# Patient Record
Sex: MALE
[De-identification: names, ages, dates, MRNs, and addresses within clinical notes are randomized per-mention and may not be internally consistent; named-entity substitution may affect disease eponyms.]

---

## 2024-03-27 ENCOUNTER — APPOINTMENT (OUTPATIENT)
Dept: ORTHOPEDIC SURGERY | Facility: CLINIC | Age: 69
End: 2024-03-27
Payer: MEDICARE

## 2024-03-27 VITALS — WEIGHT: 220 LBS | HEIGHT: 66 IN | BODY MASS INDEX: 35.36 KG/M2

## 2024-03-27 PROBLEM — Z00.00 ENCOUNTER FOR PREVENTIVE HEALTH EXAMINATION: Status: ACTIVE | Noted: 2024-03-27

## 2024-03-27 PROCEDURE — 73560 X-RAY EXAM OF KNEE 1 OR 2: CPT | Mod: LT,RT

## 2024-03-27 PROCEDURE — 99203 OFFICE O/P NEW LOW 30 MIN: CPT | Mod: 25

## 2024-03-27 RX ORDER — LIDOCAINE 5% 700 MG/1
5 PATCH TOPICAL
Qty: 30 | Refills: 1 | Status: ACTIVE | COMMUNITY
Start: 2024-03-27 | End: 1900-01-01

## 2024-03-27 RX ORDER — CHOLECALCIFEROL (VITAMIN D3) 50 MCG
50 MCG TABLET ORAL
Qty: 60 | Refills: 0 | Status: ACTIVE | COMMUNITY
Start: 2024-03-27 | End: 1900-01-01

## 2024-03-27 RX ORDER — ACETAMINOPHEN 500 MG/1
500 TABLET ORAL 3 TIMES DAILY
Qty: 120 | Refills: 0 | Status: ACTIVE | COMMUNITY
Start: 2024-03-27 | End: 1900-01-01

## 2024-03-27 RX ORDER — MELOXICAM 7.5 MG/1
7.5 TABLET ORAL
Qty: 30 | Refills: 0 | Status: ACTIVE | COMMUNITY
Start: 2024-03-27 | End: 1900-01-01

## 2024-03-27 RX ORDER — DICLOFENAC SODIUM 1% 10 MG/G
1 GEL TOPICAL DAILY
Qty: 1 | Refills: 4 | Status: ACTIVE | COMMUNITY
Start: 2024-03-27 | End: 1900-01-01

## 2024-03-27 NOTE — DISCUSSION/SUMMARY
[de-identified] : HE HAS PAIN IN BOTH KNEES.  X-RAYS TAKEN TODAY IN OUR OFFICE OF BOTH KNEES STANDING 2 VIEWS DO NOT SHOW SEVERE DJD EVEN THOUGH CLINICALLY HE DOES HAVE SOME GENU VARUM  WE ARE TO SEND HIM FOR MRIS OF BOTH KNEES AS WELL AS PHYSICAL THERAPY FOR BOTH KNEES AND GENTLE AEROBIC CONDITIONING.  We are prescribing meloxicam, Tylenol, calcium, vitamin D, Lidoderm patches and Voltaren gel  OVERALL PHYSICAL EXAM GENERAL: Well developed well nourished in no acute distress   MENTAL:Alert and oriented x3, normal affect, Pleasant and accompanied by family   MUSCULOSKELETAL: Ambulating independently   HEENT: NCAT, EOM intact, mucosa moist, neck supple with adequate free rom   CARDIOVASCULAR/HEMATOLOGICAL: no JVD, no peripheral edema, good capillary refill,  skin warm and well perfused, no venous stasis ulcers, pulses intact, no pallor or superficial non-traumatic bruising   NEUROLOGICAL: free passive rom without rigidity or cog wheel motion   RESPIRATORY: no gross stridor or wheezing or increased respiratory effort or use of O2, good capil refill and color   INTEGUMENTARY: skin intact without obvious suspicious lesions where examined Bilateral KNEE EXAM There range of motion is preserved.  They are slightly uncomfortable at the patellofemoral joint with compression and with resisted straight leg raising.  There is a very mild effusion and they do have joint line tenderness medial greater than lateral.  Anterior and posterior drawer are negative.  The knee is stable to varus valgus stress there neurovascular intact without signs or symptoms of DVT

## 2024-04-09 ENCOUNTER — APPOINTMENT (OUTPATIENT)
Dept: MRI IMAGING | Facility: CLINIC | Age: 69
End: 2024-04-09

## 2024-05-31 ENCOUNTER — APPOINTMENT (OUTPATIENT)
Dept: MRI IMAGING | Facility: CLINIC | Age: 69
End: 2024-05-31
Payer: MEDICARE

## 2024-05-31 PROCEDURE — 73721 MRI JNT OF LWR EXTRE W/O DYE: CPT | Mod: RT

## 2024-05-31 PROCEDURE — 73721 MRI JNT OF LWR EXTRE W/O DYE: CPT | Mod: LT

## 2024-06-24 ENCOUNTER — APPOINTMENT (OUTPATIENT)
Dept: ORTHOPEDIC SURGERY | Facility: CLINIC | Age: 69
End: 2024-06-24
Payer: MEDICARE

## 2024-06-24 DIAGNOSIS — M85.89 OTHER SPECIFIED DISORDERS OF BONE DENSITY AND STRUCTURE, MULTIPLE SITES: ICD-10-CM

## 2024-06-24 DIAGNOSIS — M23.92 UNSPECIFIED INTERNAL DERANGEMENT OF LEFT KNEE: ICD-10-CM

## 2024-06-24 DIAGNOSIS — M23.91 UNSPECIFIED INTERNAL DERANGEMENT OF RIGHT KNEE: ICD-10-CM

## 2024-06-24 PROCEDURE — 99213 OFFICE O/P EST LOW 20 MIN: CPT

## 2024-07-10 ENCOUNTER — APPOINTMENT (OUTPATIENT)
Dept: ORTHOPEDIC SURGERY | Facility: CLINIC | Age: 69
End: 2024-07-10
Payer: MEDICARE

## 2024-07-10 DIAGNOSIS — S83.232D COMPLEX TEAR OF MEDIAL MENISCUS, CURRENT INJURY, LEFT KNEE, SUBSEQUENT ENCOUNTER: ICD-10-CM

## 2024-07-10 DIAGNOSIS — S83.231D COMPLEX TEAR OF MEDIAL MENISCUS, CURRENT INJURY, RIGHT KNEE, SUBSEQUENT ENCOUNTER: ICD-10-CM

## 2024-07-10 PROCEDURE — 99214 OFFICE O/P EST MOD 30 MIN: CPT

## 2024-08-06 ENCOUNTER — TRANSCRIPTION ENCOUNTER (OUTPATIENT)
Age: 69
End: 2024-08-06

## 2024-08-06 ENCOUNTER — OUTPATIENT (OUTPATIENT)
Dept: OUTPATIENT SERVICES | Facility: HOSPITAL | Age: 69
LOS: 1 days | Discharge: HOME | End: 2024-08-06
Payer: MEDICARE

## 2024-08-06 ENCOUNTER — APPOINTMENT (OUTPATIENT)
Dept: ORTHOPEDIC SURGERY | Facility: AMBULATORY SURGERY CENTER | Age: 69
End: 2024-08-06

## 2024-08-06 VITALS
HEART RATE: 73 BPM | SYSTOLIC BLOOD PRESSURE: 109 MMHG | OXYGEN SATURATION: 99 % | RESPIRATION RATE: 12 BRPM | DIASTOLIC BLOOD PRESSURE: 66 MMHG

## 2024-08-06 VITALS
WEIGHT: 199.96 LBS | TEMPERATURE: 98 F | RESPIRATION RATE: 18 BRPM | HEART RATE: 64 BPM | HEIGHT: 65 IN | DIASTOLIC BLOOD PRESSURE: 61 MMHG | OXYGEN SATURATION: 97 % | SYSTOLIC BLOOD PRESSURE: 105 MMHG

## 2024-08-06 DIAGNOSIS — S83.231D COMPLEX TEAR OF MEDIAL MENISCUS, CURRENT INJURY, RIGHT KNEE, SUBSEQUENT ENCOUNTER: ICD-10-CM

## 2024-08-06 DIAGNOSIS — M23.221 DERANGEMENT OF POSTERIOR HORN OF MEDIAL MENISCUS DUE TO OLD TEAR OR INJURY, RIGHT KNEE: ICD-10-CM

## 2024-08-06 DIAGNOSIS — E78.00 PURE HYPERCHOLESTEROLEMIA, UNSPECIFIED: ICD-10-CM

## 2024-08-06 DIAGNOSIS — M94.261 CHONDROMALACIA, RIGHT KNEE: ICD-10-CM

## 2024-08-06 DIAGNOSIS — I10 ESSENTIAL (PRIMARY) HYPERTENSION: ICD-10-CM

## 2024-08-06 DIAGNOSIS — Z79.82 LONG TERM (CURRENT) USE OF ASPIRIN: ICD-10-CM

## 2024-08-06 PROCEDURE — 29881 ARTHRS KNE SRG MNISECTMY M/L: CPT | Mod: RT

## 2024-08-06 RX ORDER — HYDROMORPHONE HCL IN 0.9% NACL 0.2 MG/ML
0.5 PLASTIC BAG, INJECTION (ML) INTRAVENOUS
Refills: 0 | Status: DISCONTINUED | OUTPATIENT
Start: 2024-08-06 | End: 2024-08-06

## 2024-08-06 RX ORDER — ACETAMINOPHEN 500 MG
1000 TABLET ORAL ONCE
Refills: 0 | Status: DISCONTINUED | OUTPATIENT
Start: 2024-08-06 | End: 2024-08-06

## 2024-08-06 RX ORDER — OXYCODONE AND ACETAMINOPHEN 10; 325 MG/1; MG/1
1 TABLET ORAL EVERY 4 HOURS
Refills: 0 | Status: DISCONTINUED | OUTPATIENT
Start: 2024-08-06 | End: 2024-08-06

## 2024-08-06 RX ORDER — ONDANSETRON HCL/PF 4 MG/2 ML
4 VIAL (ML) INJECTION ONCE
Refills: 0 | Status: DISCONTINUED | OUTPATIENT
Start: 2024-08-06 | End: 2024-08-06

## 2024-08-06 RX ORDER — HYDROMORPHONE HCL IN 0.9% NACL 0.2 MG/ML
1 PLASTIC BAG, INJECTION (ML) INTRAVENOUS
Refills: 0 | Status: DISCONTINUED | OUTPATIENT
Start: 2024-08-06 | End: 2024-08-06

## 2024-08-06 RX ORDER — DEXTROSE MONOHYDRATE, SODIUM CHLORIDE, SODIUM LACTATE, CALCIUM CHLORIDE, MAGNESIUM CHLORIDE 1.5; 538; 448; 18.4; 5.08 G/100ML; MG/100ML; MG/100ML; MG/100ML; MG/100ML
1000 SOLUTION INTRAPERITONEAL
Refills: 0 | Status: DISCONTINUED | OUTPATIENT
Start: 2024-08-06 | End: 2024-08-06

## 2024-08-06 NOTE — BRIEF OPERATIVE NOTE - NSICDXBRIEFPROCEDURE_GEN_ALL_CORE_FT
PROCEDURES:  Medial meniscectomy of right knee 06-Aug-2024 16:15:35  Juvenal Berumen  Lateral meniscectomy of right knee 06-Aug-2024 16:15:43  Juvenal Berumen   PROCEDURES:  Medial meniscectomy of right knee 06-Aug-2024 16:15:35  Juvenal Berumen

## 2024-08-06 NOTE — ASU DISCHARGE PLAN (ADULT/PEDIATRIC) - ASU DC SPECIAL INSTRUCTIONSFT
- weight bearing as tolerated  - keep dressing clean and dry  - no showering or bathing with the dressing on -keep on for 5 days  - follow up with Dr. Love in clinic at [address] [place] [address]

## 2024-08-06 NOTE — PROCEDURE
[FreeTextEntry3] : surg. johnson rt kne scop e med men, grade 2-3 med jt and [facility] [id number]

## 2024-08-06 NOTE — BRIEF OPERATIVE NOTE - OPERATION/FINDINGS
degenerative tear of the right menisci, intact ACL degenerative tear of the right medial meniscus, intact lateral meniscus, intact ACL, mild patellofemoral arthritis

## 2024-08-14 ENCOUNTER — APPOINTMENT (OUTPATIENT)
Dept: ORTHOPEDIC SURGERY | Facility: CLINIC | Age: 69
End: 2024-08-14
Payer: MEDICARE

## 2024-08-14 DIAGNOSIS — S83.231D COMPLEX TEAR OF MEDIAL MENISCUS, CURRENT INJURY, RIGHT KNEE, SUBSEQUENT ENCOUNTER: ICD-10-CM

## 2024-08-14 PROCEDURE — 99024 POSTOP FOLLOW-UP VISIT: CPT

## 2024-08-14 NOTE — IMAGING
[de-identified] : Physical exam of the right knee: Mild effusion.  No erythema or ecchymosis.  The surgical portals are healing well.  Clean, dry, intact.  No surrounding erythema or drainage noted.  Full extension, flexion to 90 degrees.  No tenderness to palpation over the joint lines.  No calf pain, negative Homans' sign.  He is ambulating with a cane today.

## 2024-08-14 NOTE — DISCUSSION/SUMMARY
[de-identified] : Today the sutures were removed, Steri-Strips were placed.  He can weight-bear as tolerated.  He was advised to ice the knee 3 times a day for 20 minutes at a time.  He will start formal physical therapy for the right knee, Rx provided for that.  I did inform the patient that he does have some arthritis in the knee.  I did inform him that we can treat that in the future with a cortisone injection and potentially gel injections.  I will see him back in 2 months for further evaluation.

## 2024-08-14 NOTE — HISTORY OF PRESENT ILLNESS
[de-identified] : The patient is a 68-year-old male, accompanied by his son, here for a subsequent reevaluation of his right knee.  He is status post right knee arthroscopy, meniscectomy 8/6/2024.  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  This is his initial postoperative visit.  His knee is doing well, he states he gets some pain when he walks.

## 2024-09-25 ENCOUNTER — APPOINTMENT (OUTPATIENT)
Dept: ORTHOPEDIC SURGERY | Facility: CLINIC | Age: 69
End: 2024-09-25
Payer: MEDICARE

## 2024-09-25 DIAGNOSIS — S83.232D COMPLEX TEAR OF MEDIAL MENISCUS, CURRENT INJURY, LEFT KNEE, SUBSEQUENT ENCOUNTER: ICD-10-CM

## 2024-09-25 DIAGNOSIS — S83.231D COMPLEX TEAR OF MEDIAL MENISCUS, CURRENT INJURY, RIGHT KNEE, SUBSEQUENT ENCOUNTER: ICD-10-CM

## 2024-09-25 PROCEDURE — 99213 OFFICE O/P EST LOW 20 MIN: CPT

## 2024-09-25 NOTE — DISCUSSION/SUMMARY
[de-identified] : Impression: Status post right knee arthroscopy. Left knee pain mild arthritis and meniscal tear  Plan: Patient was advised to continue with over-the-counter anti-inflammatories for pain or Tylenol since he has a history of hypertension taking for medications, if the pain does not subside with the Tylenol to take some ibuprofen as needed and if he takes ibuprofen for him to check his blood pressure, patient agrees.  Patient is going to be traveling on October he come back on January 2025 at that time he will follow-up for his left knee

## 2024-09-25 NOTE — IMAGING
[de-identified] : On examination of the right and left knee, no swelling, no ecchymosis, no erythema. Some tender when palpating the medial joint bilaterally. No signs of instability. Negative Baljit's to the right knee, equivocal to the left knee. Neurovascular intact.  Normal gait.  Ambulates with a cane.

## 2024-09-25 NOTE — HISTORY OF PRESENT ILLNESS
[de-identified] : 69-year-old male here for repeat evaluation of pain to the right in this knee, patient had arthroscopy to the right knee about 2 months ago, patient does admits to improvement in symptoms, but she still has pain on and off with ambulation. Patient also states that his left knee has been hurting.

## 2024-12-08 NOTE — ASU DISCHARGE PLAN (ADULT/PEDIATRIC) - PAIN MANAGEMENT
Prescriptions electronically submitted to pharmacy from doctor's office All other ROS negative unless otherwise specified in HPI.

## 2025-01-22 ENCOUNTER — NON-APPOINTMENT (OUTPATIENT)
Age: 70
End: 2025-01-22

## 2025-01-22 DIAGNOSIS — S83.232D COMPLEX TEAR OF MEDIAL MENISCUS, CURRENT INJURY, LEFT KNEE, SUBSEQUENT ENCOUNTER: ICD-10-CM

## 2025-01-24 ENCOUNTER — APPOINTMENT (OUTPATIENT)
Dept: ORTHOPEDIC SURGERY | Facility: CLINIC | Age: 70
End: 2025-01-24